# Patient Record
Sex: FEMALE | Race: WHITE | Employment: FULL TIME | URBAN - METROPOLITAN AREA
[De-identification: names, ages, dates, MRNs, and addresses within clinical notes are randomized per-mention and may not be internally consistent; named-entity substitution may affect disease eponyms.]

---

## 2022-07-07 ENCOUNTER — LAB ENCOUNTER (OUTPATIENT)
Dept: LAB | Age: 62
End: 2022-07-07
Attending: HOSPITALIST
Payer: MEDICAID

## 2022-07-07 DIAGNOSIS — J45.40 MODERATE PERSISTENT ASTHMA: Primary | ICD-10-CM

## 2022-07-07 LAB
A1AT SERPL-MCNC: 134 MG/DL (ref 90–200)
BASOPHILS # BLD AUTO: 0.03 X10(3) UL (ref 0–0.2)
BASOPHILS NFR BLD AUTO: 0.6 %
EOSINOPHIL # BLD AUTO: 0.03 X10(3) UL (ref 0–0.7)
EOSINOPHIL NFR BLD AUTO: 0.6 %
ERYTHROCYTE [DISTWIDTH] IN BLOOD BY AUTOMATED COUNT: 11.9 %
HCT VFR BLD AUTO: 43.6 %
HGB BLD-MCNC: 14 G/DL
IMM GRANULOCYTES # BLD AUTO: 0.02 X10(3) UL (ref 0–1)
IMM GRANULOCYTES NFR BLD: 0.4 %
LYMPHOCYTES # BLD AUTO: 1.52 X10(3) UL (ref 1–4)
LYMPHOCYTES NFR BLD AUTO: 29.2 %
MCH RBC QN AUTO: 32.1 PG (ref 26–34)
MCHC RBC AUTO-ENTMCNC: 32.1 G/DL (ref 31–37)
MCV RBC AUTO: 100 FL
MONOCYTES # BLD AUTO: 0.38 X10(3) UL (ref 0.1–1)
MONOCYTES NFR BLD AUTO: 7.3 %
NEUTROPHILS # BLD AUTO: 3.22 X10 (3) UL (ref 1.5–7.7)
NEUTROPHILS # BLD AUTO: 3.22 X10(3) UL (ref 1.5–7.7)
NEUTROPHILS NFR BLD AUTO: 61.9 %
PLATELET # BLD AUTO: 179 10(3)UL (ref 150–450)
RBC # BLD AUTO: 4.36 X10(6)UL
WBC # BLD AUTO: 5.2 X10(3) UL (ref 4–11)

## 2022-07-07 PROCEDURE — 83516 IMMUNOASSAY NONANTIBODY: CPT

## 2022-07-07 PROCEDURE — 86008 ALLG SPEC IGE RECOMB EA: CPT

## 2022-07-07 PROCEDURE — 85025 COMPLETE CBC W/AUTO DIFF WBC: CPT

## 2022-07-07 PROCEDURE — 82103 ALPHA-1-ANTITRYPSIN TOTAL: CPT

## 2022-07-07 PROCEDURE — 36415 COLL VENOUS BLD VENIPUNCTURE: CPT

## 2022-07-07 PROCEDURE — 86036 ANCA SCREEN EACH ANTIBODY: CPT

## 2022-07-09 LAB — Lab: <0.1 KU/L

## 2022-07-10 LAB
MYELOPEROX ANTIBODIES, IGG: 0 AU/ML
SERINE PROTEASE 3, IGG: 10 AU/ML

## 2022-07-12 ENCOUNTER — LAB ENCOUNTER (OUTPATIENT)
Dept: LAB | Age: 62
End: 2022-07-12
Attending: ALLERGY & IMMUNOLOGY
Payer: MEDICAID

## 2022-07-12 DIAGNOSIS — J30.9 ALLERGIC RHINITIS DUE TO ALLERGEN: ICD-10-CM

## 2022-07-12 DIAGNOSIS — J45.50 SEVERE PERSISTENT ASTHMA: Primary | ICD-10-CM

## 2022-07-12 DIAGNOSIS — Z91.018 ALLERGY TO OTHER FOODS: ICD-10-CM

## 2022-07-12 LAB
BASOPHILS # BLD AUTO: 0.03 X10(3) UL (ref 0–0.2)
BASOPHILS NFR BLD AUTO: 0.5 %
EOSINOPHIL # BLD AUTO: 0.03 X10(3) UL (ref 0–0.7)
EOSINOPHIL NFR BLD AUTO: 0.5 %
ERYTHROCYTE [DISTWIDTH] IN BLOOD BY AUTOMATED COUNT: 12.2 %
HCT VFR BLD AUTO: 42.3 %
HGB BLD-MCNC: 14 G/DL
IMM GRANULOCYTES # BLD AUTO: 0.04 X10(3) UL (ref 0–1)
IMM GRANULOCYTES NFR BLD: 0.7 %
LYMPHOCYTES # BLD AUTO: 1.68 X10(3) UL (ref 1–4)
LYMPHOCYTES NFR BLD AUTO: 30.7 %
MCH RBC QN AUTO: 32.6 PG (ref 26–34)
MCHC RBC AUTO-ENTMCNC: 33.1 G/DL (ref 31–37)
MCV RBC AUTO: 98.4 FL
MONOCYTES # BLD AUTO: 0.39 X10(3) UL (ref 0.1–1)
MONOCYTES NFR BLD AUTO: 7.1 %
NEUTROPHILS # BLD AUTO: 3.31 X10 (3) UL (ref 1.5–7.7)
NEUTROPHILS # BLD AUTO: 3.31 X10(3) UL (ref 1.5–7.7)
NEUTROPHILS NFR BLD AUTO: 60.5 %
PLATELET # BLD AUTO: 179 10(3)UL (ref 150–450)
RBC # BLD AUTO: 4.3 X10(6)UL
WBC # BLD AUTO: 5.5 X10(3) UL (ref 4–11)

## 2022-07-12 PROCEDURE — 82785 ASSAY OF IGE: CPT

## 2022-07-12 PROCEDURE — 86003 ALLG SPEC IGE CRUDE XTRC EA: CPT

## 2022-07-12 PROCEDURE — 85025 COMPLETE CBC W/AUTO DIFF WBC: CPT

## 2022-07-12 PROCEDURE — 36415 COLL VENOUS BLD VENIPUNCTURE: CPT

## 2022-07-14 LAB
A ALTERNATA IGE QN: <0.1 KUA/L (ref ?–0.1)
A FUMIGATUS IGE QN: <0.1 KUA/L (ref ?–0.1)
ALLERGEN, BELL PEPPER, PAPRIKA: <0.1 KU/L
ALLERGEN, LENTIL IGE: <0.1 KU/L
AMER SYCAMORE IGE QN: <0.1 KUA/L (ref ?–0.1)
BERMUDA GRASS IGE QN: <0.1 KUA/L (ref ?–0.1)
BOXELDER IGE QN: <0.1 KUA/L (ref ?–0.1)
C HERBARUM IGE QN: <0.1 KUA/L (ref ?–0.1)
CALIF WALNUT IGE QN: <0.1 KUA/L (ref ?–0.1)
CAT DANDER IGE QN: <0.1 KUA/L (ref ?–0.1)
CLAM IGE QN: <0.1 KUA/L (ref ?–0.1)
CMN PIGWEED IGE QN: <0.1 KUA/L (ref ?–0.1)
CODFISH IGE QN: <0.1 KUA/L (ref ?–0.1)
COMMON RAGWEED IGE QN: <0.1 KUA/L (ref ?–0.1)
CORN IGE QN: <0.1 KUA/L (ref ?–0.1)
CORN IGE QN: <0.1 KUA/L (ref ?–0.1)
COTTONWOOD IGE QN: <0.1 KUA/L (ref ?–0.1)
COW MILK IGE QN: <0.1 KUA/L (ref ?–0.1)
D FARINAE IGE QN: <0.1 KUA/L (ref ?–0.1)
D PTERONYSS IGE QN: 0.14 KUA/L (ref ?–0.1)
DOG DANDER IGE QN: <0.1 KUA/L (ref ?–0.1)
EGG WHITE IGE QN: <0.1 KUA/L (ref ?–0.1)
IGE SERPL-ACNC: 2.38 KU/L (ref 2–214)
IGE SERPL-ACNC: 3.48 KU/L (ref 2–214)
Lab: <0.1 KU/L
M RACEMOSUS IGE QN: <0.1 KUA/L (ref ?–0.1)
MARSH ELDER IGE QN: <0.1 KUA/L (ref ?–0.1)
MOUSE EPITH IGE QN: <0.1 KUA/L (ref ?–0.1)
MT JUNIPER IGE QN: <0.1 KUA/L (ref ?–0.1)
P NOTATUM IGE QN: <0.1 KUA/L (ref ?–0.1)
PEANUT IGE QN: <0.1 KUA/L (ref ?–0.1)
PECAN/HICK TREE IGE QN: <0.1 KUA/L (ref ?–0.1)
ROACH IGE QN: <0.1 KUA/L (ref ?–0.1)
SALTWORT IGE QN: <0.1 KUA/L (ref ?–0.1)
SCALLOP IGE QN: <0.1 KUA/L (ref ?–0.1)
SESAME SEED IGE QN: <0.1 KUA/L (ref ?–0.1)
SHRIMP IGE QN: <0.1 KUA/L (ref ?–0.1)
SOYBEAN IGE QN: <0.1 KUA/L (ref ?–0.1)
TIMOTHY IGE QN: <0.1 KUA/L (ref ?–0.1)
WALNUT IGE QN: <0.1 KUA/L (ref ?–0.1)
WHEAT IGE QN: <0.1 KUA/L (ref ?–0.1)
WHITE ASH IGE QN: <0.1 KUA/L (ref ?–0.1)
WHITE ELM IGE QN: <0.1 KUA/L (ref ?–0.1)
WHITE MULBERRY IGE QN: <0.1 KUA/L (ref ?–0.1)
WHITE OAK IGE QN: <0.1 KUA/L (ref ?–0.1)

## 2023-03-21 ENCOUNTER — TELEMEDICINE (OUTPATIENT)
Facility: CLINIC | Age: 63
End: 2023-03-21
Payer: MEDICAID

## 2023-03-21 DIAGNOSIS — J45.40 MODERATE PERSISTENT ASTHMA, UNSPECIFIED WHETHER COMPLICATED: Primary | ICD-10-CM

## 2023-03-21 RX ORDER — FLUTICASONE PROPIONATE AND SALMETEROL 500; 50 UG/1; UG/1
1 POWDER RESPIRATORY (INHALATION) 2 TIMES DAILY
Qty: 1 EACH | Refills: 5 | Status: SHIPPED | OUTPATIENT
Start: 2023-03-21 | End: 2023-04-20

## 2023-03-21 RX ORDER — ALBUTEROL SULFATE 2.5 MG/3ML
2.5 SOLUTION RESPIRATORY (INHALATION) 3 TIMES DAILY
Qty: 180 EACH | Refills: 5 | Status: SHIPPED | OUTPATIENT
Start: 2023-03-21

## 2023-03-21 RX ORDER — ALBUTEROL SULFATE 2.5 MG/3ML
2.5 SOLUTION RESPIRATORY (INHALATION) 3 TIMES DAILY
Qty: 180 EACH | Refills: 5 | Status: SHIPPED | OUTPATIENT
Start: 2023-03-21 | End: 2023-03-21

## 2023-03-21 NOTE — H&P
Pulmonary Consult Note    History of Present Illness:  Vanita Rosales is a 58year old female presenting to pulmonary clinic today for dyspnea   DARYL and asthma 'intlerant of CPAP - using dental appliance- - had to stop releted to need for crowns   Asthma better with mdi-   Saw dr tate--   Told gerd- - careful with doet- and needs to lose weight and no eercise   Gets tired - too tired to exercise - trying for short time   advair twice a day and rescue -   Last year was much better-- bought portable neb- seasalt and albuterol - feels better longer-  - daily 1-2 times in the evening   Stopped singular - ran out   Dr Stella Rios     Past Medical History: No past medical history on file. Long history of GERD though beginning 2018-following with GI at [facility]  Symptoms of DARYL-positive study now working through dental appliance  Denies any cardiac history    No history of clot no history of cancer      Past Surgical History: No past surgical history on file. Family Medical History: No family history on file. Social History: Social History    Socioeconomic History      Marital status:   Works 3-9 daily  6 children multiple grandchildren      Allergies: Patient has no allergy information on record. Medications:   No current outpatient medications on file. Review of Systems:   awakenings up at night - uses albuterol   Gaining weight   On gerd diet   Remains on 20mg ppi BID - fodmap -- esophagitis  on recent esophagram -- [address] very tired  Denies any swelling  Chest pressure is better with PPI  Continues to report herself as a light sleeper        Physical Exam:  There were no vitals taken for this visit.    Patient is comfortable no coughing no dyspnea pleasant    Results:  Reviewed     Assessment/Plan:  #History of dyspnea on exertion  History of asthma as a child improved with swimming lessons  2018 started with GERD subsequent shortness of breath following that thought related to esophagitis at that time  PFTs 6/22 with FEV1 1.44 L 56% of predicted with ratio of 65% and a 19% improvement following bronchodilators  3/23 had noted significant benefit when first on Advair with minimal rescue use now increasing rescue use-suspected component of fatigue as well--plan increase Advair to 500 and follow    #Chest pressure right side  Reports negative stress test at [facility]  Echo 2/27 with diastolic dysfunction grade 1 and normal RV function  3/23 denies any significant issues      # DARYL  Symptoms compatible  Home test 6/22 with AHI of 12.9 ashley to 76% with 2 to 3 hours of sats under 88%  Tried CPAP for 3 to 4 days reports intolerant--now with dental appliance though unable to wear secondary to need for crown--discussed mechanisms at length encouraged to    # gerd /Hx Hpylori   History of esophagitis and gastritis on scope 2018  Recent upper scope and fluoroscopy esophagram 1/23 with signs of esophagitis and gastritis-follows with  at [facility] trial diet and continue PPI twice daily    Plan- to stop advair 250 and begin advair 500 twice a day          - continue singular           - to get dental appliance asap   -        -see me in 3 months   -    Alesha Arias MD  Pulmonary Medicine  3/21/2023

## 2023-03-21 NOTE — PATIENT INSTRUCTIONS
Plan- to stop advair 250 and begin advair 500 twice a day          - continue singular           - to get dental appliance asap   -        -see me in 3 months   -    Kriss Ascencio MD  Pulmonary Medicine  3/21/2023

## 2023-05-12 ENCOUNTER — MOBILE ENCOUNTER (OUTPATIENT)
Facility: CLINIC | Age: 63
End: 2023-05-12

## 2023-05-12 RX ORDER — FLUTICASONE PROPIONATE AND SALMETEROL 500; 50 UG/1; UG/1
1 POWDER RESPIRATORY (INHALATION) 2 TIMES DAILY
Qty: 1 EACH | Refills: 0 | Status: SHIPPED | OUTPATIENT
Start: 2023-05-12 | End: 2023-06-11

## 2023-07-11 ENCOUNTER — OFFICE VISIT (OUTPATIENT)
Facility: CLINIC | Age: 63
End: 2023-07-11
Payer: MEDICAID

## 2023-07-11 VITALS
OXYGEN SATURATION: 98 % | BODY MASS INDEX: 36 KG/M2 | HEART RATE: 84 BPM | WEIGHT: 224 LBS | RESPIRATION RATE: 14 BRPM | SYSTOLIC BLOOD PRESSURE: 124 MMHG | HEIGHT: 66 IN | DIASTOLIC BLOOD PRESSURE: 74 MMHG

## 2023-07-11 DIAGNOSIS — J45.41 MODERATE PERSISTENT ASTHMA WITH ACUTE EXACERBATION: Primary | ICD-10-CM

## 2023-07-11 PROCEDURE — 3008F BODY MASS INDEX DOCD: CPT | Performed by: INTERNAL MEDICINE

## 2023-07-11 PROCEDURE — 99214 OFFICE O/P EST MOD 30 MIN: CPT | Performed by: INTERNAL MEDICINE

## 2023-07-11 PROCEDURE — 3078F DIAST BP <80 MM HG: CPT | Performed by: INTERNAL MEDICINE

## 2023-07-11 PROCEDURE — 3074F SYST BP LT 130 MM HG: CPT | Performed by: INTERNAL MEDICINE

## 2023-07-11 RX ORDER — FLUTICASONE PROPIONATE AND SALMETEROL 500; 50 UG/1; UG/1
1 POWDER RESPIRATORY (INHALATION) 2 TIMES DAILY
COMMUNITY

## 2023-07-11 NOTE — PATIENT INSTRUCTIONS
Plan- continue  advair 500 twice a day - rinse and spit          - resume singular every night   Continue nexium twice a day   Begin pepcid ac- twice a day - as trial for 2 weeks -        -           - to get dental appliance asap -           - to get PFTS   -        -see me in 2-3  months   -    Yi Lu MD  Pulmonary Medicine  7/11/2023

## 2023-09-18 ENCOUNTER — TELEPHONE (OUTPATIENT)
Facility: CLINIC | Age: 63
End: 2023-09-18

## 2023-09-18 NOTE — TELEPHONE ENCOUNTER
Pt is requesting her PFT order be sent to [facility] [place]\" but no such providing location found. Called the facility she quoted the address from but they are only a Cardiac doctor. Was directed to [facility] for [employer] but they have no facilities in \"[place]\" or [facility] as pt mentioned may be the case. Before we forward the order I requested pt cb to confirm where she plans to be tested and we'll forward accordingly.

## 2023-09-20 NOTE — TELEPHONE ENCOUNTER
Spoke with pt. Stated has hard copy of previous PFT results done at [facility], will bring those results to appointment scheduled with Dr. Kathleen Estrella on 10/11/23. Pt states she has another order for PFT scheduled for 10/4 at [facility]. Pt advised to give them our fax number for results to be faxed over to us for Dr. Kathleen Estrella to have for comparison. Pt verbalized understanding. Pt unable to take fax number at this time, wants number to be provided via [employer] message. Message sent via [address]  PFT order faxed to [facility] Cardiopulmonary department @ [phone]

## 2023-10-04 DIAGNOSIS — J45.40 MODERATE PERSISTENT ASTHMA, UNSPECIFIED WHETHER COMPLICATED: ICD-10-CM

## 2023-10-04 DIAGNOSIS — J45.41 MODERATE PERSISTENT ASTHMA WITH ACUTE EXACERBATION: Primary | ICD-10-CM

## 2023-10-04 NOTE — TELEPHONE ENCOUNTER
Received RX refill request for: Advair Diskus. Pt has next scheduled appt: 10/11/23. Pt last OV: 07/11/23. Last OV note states: \"Plan- continue  advair 500 twice a day - rinse and spit \"  RX pended and routed to provider.

## 2023-10-06 RX ORDER — FLUTICASONE PROPIONATE AND SALMETEROL 500; 50 UG/1; UG/1
1 POWDER RESPIRATORY (INHALATION) 2 TIMES DAILY
Qty: 3 EACH | Refills: 3 | Status: SHIPPED | OUTPATIENT
Start: 2023-10-06 | End: 2023-11-05

## 2023-11-28 DIAGNOSIS — J45.40 MODERATE PERSISTENT ASTHMA, UNSPECIFIED WHETHER COMPLICATED: Primary | ICD-10-CM

## 2023-11-28 RX ORDER — FLUTICASONE PROPIONATE AND SALMETEROL 500; 50 UG/1; UG/1
1 POWDER RESPIRATORY (INHALATION) 2 TIMES DAILY
Qty: 1 EACH | Refills: 3 | Status: SHIPPED | OUTPATIENT
Start: 2023-11-28

## 2023-11-28 NOTE — TELEPHONE ENCOUNTER
Received RX refill request for: ADVAIR DISKUS 500-50 MCG/ACT Inhalation Aerosol Powder   Pt has next scheduled appt: 11/30/23. Pt last OV: 07/11/23. Last OV note states: \"continue  advair 500 twice a day - rinse and spit \"  RX pended and routed to provider.

## 2023-11-30 ENCOUNTER — TELEMEDICINE (OUTPATIENT)
Facility: CLINIC | Age: 63
End: 2023-11-30
Payer: MEDICAID

## 2023-11-30 DIAGNOSIS — J45.40 MODERATE PERSISTENT ASTHMA, UNSPECIFIED WHETHER COMPLICATED: ICD-10-CM

## 2023-11-30 DIAGNOSIS — G47.33 OSA (OBSTRUCTIVE SLEEP APNEA): Primary | ICD-10-CM

## 2023-11-30 PROCEDURE — 99214 OFFICE O/P EST MOD 30 MIN: CPT | Performed by: INTERNAL MEDICINE

## 2023-11-30 RX ORDER — MONTELUKAST SODIUM 10 MG/1
10 TABLET ORAL NIGHTLY
Qty: 90 TABLET | Refills: 3 | Status: SHIPPED | OUTPATIENT
Start: 2023-11-30

## 2023-11-30 NOTE — PROGRESS NOTES
Pulmonary Consult Note    History of Present Illness:  Rina Wright is a 61year old female presenting to pulmonary clinic today for dyspnea   DARYL and asthma 'intlerant of CPAP - using dental appliance- - had to stop releted to need for crowns   Asthma better with mdi-   Saw dr tate--   Told gerd- - careful with doet- and needs to lose weight and no eercise   Gets tired - too tired to exercise - trying for short time   advair twice a day and rescue -   Last year was much better-- bought portable neb- seasalt and albuterol - feels better longer-  - daily 1-2 times in the evening   Stopped singular - ran out   Dr Karma Marina     Was doing well on advair 500   Last month with problem with weather and air quality -   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-   Limited by breathing to do job   Steroids thinks contributing   Asthma started 2018- zoomba etc with exercise- same sx as little girl and with pregnancy     Started dental appliance no change and then problem with teeth and now with root mariam again - stomach upset with antibiotics and GERD makes asthma worse - thinks on ppi 20mg BID-   GI at [facility]-   Started with nutritionist - fodda diet - no change in GERD --  Sinus is OK_ some throat clearing and remains on flonase and astelin   Did not resume singular -   Remains with sense of chest tightness thinks gerd related -   Had swallowing video - slower passage   Had PFTS advocate rajni     11.23 - increased asthma - this year- - better. With advair 500   Works outside- bad with air quality   4-5 weeks ago with HA- as new issue and with dizzyness - thought related to sleep -- - throbbing- though better now and awaiting for MRI approval   Very sleepy - using dental appliance- wearing at night -   ?  Migraines - not caused by appliance -   Unable to stop advair for 48hours for PFTS -   HA are better now   Uses rescue at night when awakens - not during the day   Some coughing in am - shortness of breath is limiting   Much better overalll - worse in cold air and bad air   Goes to areas of storms and house issues -trying to get finacial aid to be    Remains with gerd - and bloating     30min video visit         Past Medical History:   Past Medical History:   Diagnosis Date    ALCOHOL USE     Socially    Asthma 2018    Hyperlipidemia Years ago    Obesity Long time ago    Years ago    Sleep apnea       Long history of GERD though beginning 2018-following with GI at [facility]  Symptoms of DARYL-positive study now working through dental appliance  Denies any cardiac history    No history of clot no history of cancer      Past Surgical History:   Past Surgical History:   Procedure Laterality Date      , , , ,,     6    COLONOSCOPY      [facility]    OTHER SURGICAL HISTORY      Abdominoplasty    TUBAL LIGATION         Family Medical History:   Family History   Problem Relation Age of Onset    Cancer Mother         Had a oace maker    Heart Disorder Mother     Heart Disorder Father     Stroke Maternal Grandfather     Hypertension Maternal Grandmother     Asthma Paternal Grandfather     Diabetes Paternal Grandfather        Social History: Social History    Socioeconomic History      Marital status:   Works 3-9 daily  6 children multiple grandchildren      Allergies: Patient has no known allergies. Medications:   Current Outpatient Medications   Medication Sig Dispense Refill    fluticasone-salmeterol (ADVAIR DISKUS) 500-50 MCG/ACT Inhalation Aerosol Powder, Breath Activated Inhale 1 puff into the lungs 2 (two) times daily. 1 each 3    albuterol (2.5 MG/3ML) 0.083% Inhalation Nebu Soln Take 3 mL (2.5 mg total) by nebulization 3 (three) times daily.  180 each 5       Review of Systems:   awakenings up at night - uses albuterol   Gaining weight -- was 221 now 224- 217 now   On gerd diet   Remains on 20mg ppi BID - fodmap -- esophagitis  on recent esophagram -- [employer] - notes bloating   Had zoom visit - scopes last year- cpm - for nutirtionist   No swelling in legs   Remains very tired  Denies any swelling  Chest pressure is better with PPI  Continues to report herself as a light sleeper  Using dental appliance -- did not repeat test           Physical Exam:  There were no vitals taken for this visit. Patient is comfortable no coughing no dyspnea pleasant  Results:  Reviewed     Assessment/Plan:    #History of dyspnea on exertion  History of asthma as a child improved with swimming lessons  2018 started with GERD subsequent shortness of breath following that thought related to esophagitis at that time  PFTs 6/22 with FEV1 1.44 L 56% of predicted with ratio of 65% and a 19% improvement following bronchodilators  3/23 had noted significant benefit when first on Advair with minimal rescue use now increasing rescue use-suspected component of fatigue as well--plan increase Advair to 500 and follow  7.23- was better then recurred with bad air and outside work - - to resume singular - ?  GERD related -plan for trial of PPI-May need to consider adding LAMA-recheck PFTs  11/23--reports a rough summer overall better on Advair 500  Worse with air quality issues-notes increased symptoms when stopping Advair  Plan to continue present management resume Singulair-and await PFTs      #Chest pressure right side  Reports negative stress test at [facility]  Echo 2/24 with diastolic dysfunction grade 1 and normal RV function  3/23 denies any significant issues  7/23 denies issues      #New onset headaches  Occurred after stopping Singulair  Does not believe associated with her dental appliance as they started prior to use  Continues to follow with primary with MRI pending        # DARYL  Symptoms compatible  Home test 6/22 with AHI of 12.9 ashley to 76% with 2 to 3 hours of sats under 88%  Tried CPAP for 3 to 4 days reports intolerant--now with dental appliance though unable to wear secondary to need for crown--discussed mechanisms at length encouraged to  7.23- - never resumed dental appliance- - ongoing teeth issues limiting   Discussed at length needs repeat study once appliance is functional versus repeat trial of CPAP following titration  11/23 believes her appliance is fully adjusted with plans for sleep study using and benefiting    # gerd /Hx Hpylori   History of esophagitis and gastritis on scope 2018  Recent upper scope and fluoroscopy esophagram 1/23 with signs of esophagitis and gastritis-follows with  at [facility] trial diet and continue PPI twice daily  7.23- flare  of asthma and ongoing /increased GERD_ sx- to add pepcid as trial - may need to see GI again   11/23 overall believes under control on PPI twice daily    # allergies   Remains on astelin and flonase twice a day and well controlled   Follows with [facility] at times         Plan- continue  advair 500 twice a day - rinse and spit          - resume singular every night -- stop for dreams           -to get repeat sleep study with dental appliance in place           -Plan for repeat PFTs when able   -        -see me in 3-4 months   -    Blu Goldman MD  Pulmonary Medicine  11/30/2023

## 2024-04-04 DIAGNOSIS — J45.40 MODERATE PERSISTENT ASTHMA, UNSPECIFIED WHETHER COMPLICATED (HCC): ICD-10-CM

## 2024-04-04 RX ORDER — FLUTICASONE PROPIONATE AND SALMETEROL 50; 500 UG/1; UG/1
1 POWDER RESPIRATORY (INHALATION) 2 TIMES DAILY
Qty: 1 EACH | Refills: 0 | Status: SHIPPED | OUTPATIENT
Start: 2024-04-04

## 2024-04-04 NOTE — TELEPHONE ENCOUNTER
LOV 11/30/23 with TZ    Plan- continue  advair 500 twice a day - rinse and spit          - resume singular every night -- stop for dreams           -to get repeat sleep study with dental appliance in place           -Plan for repeat PFTs when able   -        -see me in 3-4 months       Pt has not been seen within 3-4 months, has no pending appt with our office.    1 refill given, will need appt for additional refills

## 2024-05-14 DIAGNOSIS — J45.40 MODERATE PERSISTENT ASTHMA, UNSPECIFIED WHETHER COMPLICATED (HCC): ICD-10-CM

## 2024-05-14 RX ORDER — FLUTICASONE PROPIONATE AND SALMETEROL 50; 500 UG/1; UG/1
1 POWDER RESPIRATORY (INHALATION) 2 TIMES DAILY
Qty: 1 EACH | Refills: 3 | Status: SHIPPED | OUTPATIENT
Start: 2024-05-14

## 2024-06-04 ENCOUNTER — OFFICE VISIT (OUTPATIENT)
Dept: SLEEP CENTER | Age: 64
End: 2024-06-04
Attending: Other
Payer: MEDICAID

## 2024-06-04 DIAGNOSIS — G47.33 OSA (OBSTRUCTIVE SLEEP APNEA): ICD-10-CM

## 2024-06-04 PROCEDURE — 95806 SLEEP STUDY UNATT&RESP EFFT: CPT

## 2024-06-11 ENCOUNTER — SLEEP STUDY (OUTPATIENT)
Facility: CLINIC | Age: 64
End: 2024-06-11
Payer: MEDICAID

## 2024-06-11 DIAGNOSIS — G47.9 SLEEP DISORDER: Primary | ICD-10-CM

## 2024-06-11 PROCEDURE — 95806 SLEEP STUDY UNATT&RESP EFFT: CPT | Performed by: OTHER

## 2024-06-12 ENCOUNTER — TELEPHONE (OUTPATIENT)
Facility: CLINIC | Age: 64
End: 2024-06-12

## 2024-06-12 NOTE — TELEPHONE ENCOUNTER
Phone call to patient  Unattended sleep study reviewed--- patient reports that she took her dental appliance out after about 3 hours into the study  Discussed the fact that study is unhelpful in terms now of the efficacy of her dental appliance  Will ask Dr. Zacarias to review and see if more information can be gleaned  Patient to call me back in a week

## 2024-08-20 ENCOUNTER — OFFICE VISIT (OUTPATIENT)
Facility: CLINIC | Age: 64
End: 2024-08-20
Payer: MEDICAID

## 2024-08-20 VITALS
HEART RATE: 70 BPM | OXYGEN SATURATION: 100 % | BODY MASS INDEX: 37.65 KG/M2 | RESPIRATION RATE: 16 BRPM | SYSTOLIC BLOOD PRESSURE: 128 MMHG | WEIGHT: 226 LBS | HEIGHT: 65 IN | DIASTOLIC BLOOD PRESSURE: 70 MMHG

## 2024-08-20 DIAGNOSIS — R06.00 DYSPNEA AND RESPIRATORY ABNORMALITIES: Primary | ICD-10-CM

## 2024-08-20 DIAGNOSIS — J45.40 MODERATE PERSISTENT ASTHMA, UNSPECIFIED WHETHER COMPLICATED (HCC): ICD-10-CM

## 2024-08-20 DIAGNOSIS — R06.89 DYSPNEA AND RESPIRATORY ABNORMALITIES: Primary | ICD-10-CM

## 2024-08-20 DIAGNOSIS — G47.33 OSA (OBSTRUCTIVE SLEEP APNEA): ICD-10-CM

## 2024-08-20 PROCEDURE — 99214 OFFICE O/P EST MOD 30 MIN: CPT | Performed by: INTERNAL MEDICINE

## 2024-08-20 RX ORDER — FLUTICASONE PROPIONATE 50 MCG
2 SPRAY, SUSPENSION (ML) NASAL DAILY
COMMUNITY
Start: 2024-07-12

## 2024-08-20 RX ORDER — AZELASTINE 1 MG/ML
2 SPRAY, METERED NASAL 2 TIMES DAILY
COMMUNITY
Start: 2023-08-18

## 2024-08-20 RX ORDER — AZELASTINE HYDROCHLORIDE 0.5 MG/ML
1 SOLUTION/ DROPS OPHTHALMIC 2 TIMES DAILY
COMMUNITY
Start: 2024-02-20 | End: 2025-02-19

## 2024-08-20 RX ORDER — AZELASTINE 1 MG/ML
1 SPRAY, METERED NASAL 2 TIMES DAILY
Qty: 1 EACH | Refills: 5 | Status: SHIPPED | OUTPATIENT
Start: 2024-08-20

## 2024-08-20 NOTE — PROGRESS NOTES
Pulmonary Consult Note    History of Present Illness:  Gisele Hernandez is a 64 year old female presenting to pulmonary clinic today for dyspnea   DRAYL and asthma 'intlerant of CPAP - using dental appliance- - had to stop releted to need for crowns   Asthma better with mdi-   Saw dr ttae--   Told gerd- - careful with doet- and needs to lose weight and no eercise   Gets tired - too tired to exercise - trying for short time   advair twice a day and rescue -   Last year was much better-- bought portable neb- seasalt and albuterol - feels better longer-  - daily 1-2 times in the evening   Stopped singular - ran out   Dr best- lucinda     Was doing well on advair 500   Last month with problem with weather and air quality -   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-   Limited by breathing to do job   Steroids thinks contributing   Asthma started 2018- zoomba etc with exercise- same sx as little girl and with pregnancy     Started dental appliance no change and then problem with teeth and now with root mariam again - stomach upset with antibiotics and GERD makes asthma worse - thinks on ppi 20mg BID-   GI at [facility]-   Started with nutritionist - fodda diet - no change in GERD --  Sinus is OK_ some throat clearing and remains on flonase and astelin   Did not resume singular -   Remains with sense of chest tightness thinks gerd related -   Had swallowing video - slower passage   Had PFTS advocate rajni     11.23 - increased asthma - this year- - better. With advair 500   Works outside- bad with air quality   4-5 weeks ago with HA- as new issue and with dizzyness - thought related to sleep -- - throbbing- though better now and awaiting for MRI approval   Very sleepy - using dental appliance- wearing at night -   ? Migraines - not caused by appliance -   Unable to stop advair for 48hours for PFTS -   HA are better now   Uses rescue at night when awakens - not during the day   Some  coughing in am - shortness of breath is limiting   Much better overalll - worse in cold air and bad air   Goes to areas of storms and house issues -trying to get finacial aid to be    Remains with gerd - and bloating     - remains now with recurrance of right side chest pressure-- hurts to press on it -\"sensitive\" ? Eating related ?- can't lay on left  side when occurs -related to pressure    4-5 episodes this year-- thinks related to food- remains on nexium - in am and eats early - knows has reflux-- seen on egd-- but does not feel - all the time with bloating - better if fasting- no BM issues - magnesium or tea for constipation   Breathing is good - unless has the pressure -   Remains with ongoing sense of pressure - if no pressure remains mostly OK_ like prior to 2018         Past Medical History:   Past Medical History:    ALCOHOL USE    Socially    Asthma (HCC)    Hyperlipidemia    Obesity    Years ago    Sleep apnea      Long history of GERD though beginning -following with GI at [facility]  Symptoms of DARYL-positive study now working through dental appliance  Denies any cardiac history    No history of clot no history of cancer      Past Surgical History:   Past Surgical History:   Procedure Laterality Date      , , , ,, 1989    Colonoscopy      Hysterectomy      Other surgical history      Abdominoplasty    Tubal ligation         Family Medical History:   Family History   Problem Relation Age of Onset    Cancer Mother         Had a oace maker    Heart Disorder Mother     Heart Disorder Father     Stroke Maternal Grandfather     Hypertension Maternal Grandmother     Asthma Paternal Grandfather     Diabetes Paternal Grandfather        Social History: Social History    Socioeconomic History      Marital status:   Works 3-9 daily  6 children multiple grandchildren      Allergies: Patient has no known allergies.     Medications:    Current Outpatient Medications   Medication Sig Dispense Refill    Azelastine HCl 0.05 % Ophthalmic Solution Apply 1 drop to eye 2 (two) times daily.      azelastine 0.1 % Nasal Solution 2 sprays by Nasal route 2 (two) times daily.      fluticasone propionate 50 MCG/ACT Nasal Suspension 2 sprays by Each Nare route daily.      Esomeprazole Magnesium 20 MG Oral Capsule Delayed Release Take 1 capsule (20 mg total) by mouth daily.      fluticasone-salmeterol (ADVAIR DISKUS) 500-50 MCG/ACT Inhalation Aerosol Powder, Breath Activated INHALE 1 PUFF BY MOUTH 2 TIMES A DAY 1 each 3    albuterol (2.5 MG/3ML) 0.083% Inhalation Nebu Soln Take 3 mL (2.5 mg total) by nebulization 3 (three) times daily. 180 each 5    montelukast (SINGULAIR) 10 MG Oral Tab Take 1 tablet (10 mg total) by mouth nightly. (Patient not taking: Reported on 8/20/2024) 90 tablet 3       Review of Systems:   awakenings up at night - uses albuterol   Gaining weight -- was 221 now 224- 217 now   On gerd diet   Remains on 20mg ppi BID - fodmap -- esophagitis  on recent esophagram -- [facility] - notes bloating   Had zoom visit - scopes last year- cpm - for nutirtionist   No swelling in legs   Remains very tired  Denies any swelling  Chest pressure is better with PPI  Continues to report herself as a light sleeper  Using dental appliance -- did not repeat test           Physical Exam:  /70 (BP Location: Right arm, Patient Position: Sitting, Cuff Size: adult)   Pulse 70   Resp 16   Ht 5' 5\" (1.651 m)   Wt 226 lb (102.5 kg)   SpO2 100%   BMI 37.61 kg/m²    Patient is comfortable no coughing no dyspnea pleasant  Results:  Reviewed     Assessment/Plan:    #History of dyspnea on exertion  History of asthma as a child improved with swimming lessons  2018 started with GERD subsequent shortness of breath following that thought related to esophagitis at that time  PFTs 6/22 with FEV1 1.44 L 56% of predicted with ratio of 65% and a 19%  improvement following bronchodilators  3/23 had noted significant benefit when first on Advair with minimal rescue use now increasing rescue use-suspected component of fatigue as well--plan increase Advair to 500 and follow  7.23- was better then recurred with bad air and outside work - - to resume singular - ? GERD related -plan for trial of PPI-May need to consider adding LAMA-recheck PFTs  11/23--reports a rough summer overall better on Advair 500  Worse with air quality issues-notes increased symptoms when stopping Advair  Plan to continue present management resume Singulair-and await PFTs  8/24- stopped singular for dreams remains on advair BID and overall seems to be doing well no primary complaint remains chest pressure      #Chest pressure right side  Reports negative stress test at [facility]  Echo 7/22 with diastolic dysfunction grade 1 and normal RV function  3/23 denies any significant issues  7/23 denies issues      #New onset headaches  Occurred after stopping Singulair  Does not believe associated with her dental appliance as they started prior to use  Continues to follow with primary with MRI pending        # DARYL  Symptoms compatible  Home test 6/22 with AHI of 12.9 ashley to 76% with 2 to 3 hours of sats under 88%  Tried CPAP for 3 to 4 days reports intolerant--now with dental appliance though unable to wear secondary to need for crown--discussed mechanisms at length encouraged to  7.23- - never resumed dental appliance- - ongoing teeth issues limiting   Discussed at length needs repeat study once appliance is functional versus repeat trial of CPAP following titration  11/23 believes her appliance is fully adjusted with plans for sleep study using and benefiting  8/24- had PSG with appliance - did advance sl - thinks ? Room for improvement - no jaw pain -     # gerd /Hx Hpylori   History of esophagitis and gastritis on scope 2018  Recent upper scope and fluoroscopy esophagram 1/23 with signs of  esophagitis and gastritis-follows with  at [facility]-to trial diet and continue PPI twice daily  7.23- flare  of asthma and ongoing /increased GERD_ sx- to add pepcid as trial - may need to see GI again   11/23 overall believes under control on PPI twice daily    # allergies   Remains on astelin and flonase twice a day and well controlled   Follows with [facility] at times   8/24- no recent visit - - remains on spray- flonase only         Plan- continue  advair 500 twice a day - rinse and spit           - to get ct chest           - to resume Astelin nasal spray                  -plan to max out dental appliance and then for overnight oximeter -   -        -see me in 4 months   -    Negrita Mccall MD  Pulmonary Medicine  8/20/2024

## 2024-08-20 NOTE — PATIENT INSTRUCTIONS
Plan- continue  advair 500 twice a day - rinse and spit           - to get ct chest - to get on disc if not at [facility]           - to resume Astelin nasal spray                  -plan to max out dental appliance and then for overnight oximeter -   -        -see me in 4 months   -    Negrita Mccall MD  Pulmonary Medicine  8/20/2024

## 2024-09-09 ENCOUNTER — TELEPHONE (OUTPATIENT)
Facility: CLINIC | Age: 64
End: 2024-09-09

## 2024-09-09 DIAGNOSIS — J45.40 MODERATE PERSISTENT ASTHMA, UNSPECIFIED WHETHER COMPLICATED (HCC): ICD-10-CM

## 2024-09-09 DIAGNOSIS — G47.33 OSA (OBSTRUCTIVE SLEEP APNEA): ICD-10-CM

## 2024-09-09 DIAGNOSIS — R06.89 DYSPNEA AND RESPIRATORY ABNORMALITIES: Primary | ICD-10-CM

## 2024-09-09 DIAGNOSIS — R06.00 DYSPNEA AND RESPIRATORY ABNORMALITIES: Primary | ICD-10-CM

## 2024-09-09 RX ORDER — FLUTICASONE PROPIONATE AND SALMETEROL 50; 500 UG/1; UG/1
1 POWDER RESPIRATORY (INHALATION) 2 TIMES DAILY
Qty: 1 EACH | Refills: 3 | Status: SHIPPED | OUTPATIENT
Start: 2024-09-09

## 2024-09-09 RX ORDER — FLUTICASONE PROPIONATE 50 MCG
2 SPRAY, SUSPENSION (ML) NASAL DAILY
Qty: 16 G | Refills: 3 | Status: SHIPPED | OUTPATIENT
Start: 2024-09-09

## 2024-09-09 NOTE — TELEPHONE ENCOUNTER
Patient last seen by Dr. Mccall on 11/30/23.  Per visit notes patient to continue on Advair 500. Follow up  appointment on 12/18/24. Refills given.

## 2024-09-09 NOTE — TELEPHONE ENCOUNTER
Received a notification that HRCT denied.  Need chest xray first that shows something is wrong with pt's lungs.  Message forwarded to Dr. Mccall.  Per Dr. Mccall, send order for chest xray.  Pt notified to cancel HRCT and to schedule chest xray.

## 2024-09-17 ENCOUNTER — HOSPITAL ENCOUNTER (OUTPATIENT)
Dept: GENERAL RADIOLOGY | Age: 64
Discharge: HOME | End: 2024-09-17
Attending: INTERNAL MEDICINE
Payer: MEDICAID

## 2024-09-17 DIAGNOSIS — R06.00 DYSPNEA AND RESPIRATORY ABNORMALITIES: ICD-10-CM

## 2024-09-17 DIAGNOSIS — J45.40 MODERATE PERSISTENT ASTHMA, UNSPECIFIED WHETHER COMPLICATED (HCC): ICD-10-CM

## 2024-09-17 DIAGNOSIS — R06.89 DYSPNEA AND RESPIRATORY ABNORMALITIES: ICD-10-CM

## 2024-09-17 DIAGNOSIS — G47.33 OSA (OBSTRUCTIVE SLEEP APNEA): ICD-10-CM

## 2024-09-17 PROCEDURE — 71046 X-RAY EXAM CHEST 2 VIEWS: CPT | Performed by: INTERNAL MEDICINE

## 2024-09-26 ENCOUNTER — TELEPHONE (OUTPATIENT)
Facility: CLINIC | Age: 64
End: 2024-09-26

## 2024-09-26 NOTE — TELEPHONE ENCOUNTER
Patient called made aware of denied ct scan and steps this RN will take to help overturn denial (appeal in writing). Patient verbalized understanding, will cancel appointment. States would like to get CT scan as she feels there is something going on perhaps the granuloma and patient still symptomatic.

## 2024-09-26 NOTE — TELEPHONE ENCOUNTER
Patient called to notify that CT scan has been denied. An appeal has to be done and letter written. Patient called to advise to cancel CT scan until we get authorization. Left voicemail to return call. [id number] sent to patient notifying of the above.

## 2024-10-02 ENCOUNTER — TELEPHONE (OUTPATIENT)
Facility: CLINIC | Age: 64
End: 2024-10-02

## 2024-10-07 ENCOUNTER — TELEPHONE (OUTPATIENT)
Facility: CLINIC | Age: 64
End: 2024-10-07

## 2024-10-07 NOTE — TELEPHONE ENCOUNTER
Confirmed with Sarah CHRIS In central authorization and confirmed that PA for CT chest has been approved.  Pt  notified.  She asked if it will be covered at the facility in [place].  Advised pt that if the [place] facility is part of [place], they should be able to view the authorization.  Pt verbalizes understanding.

## 2024-10-20 ENCOUNTER — HOSPITAL ENCOUNTER (OUTPATIENT)
Dept: CT IMAGING | Age: 64
End: 2024-10-20
Attending: INTERNAL MEDICINE
Payer: MEDICAID

## 2024-10-20 ENCOUNTER — HOSPITAL ENCOUNTER (OUTPATIENT)
Dept: CT IMAGING | Age: 64
Discharge: HOME | End: 2024-10-20
Attending: INTERNAL MEDICINE
Payer: MEDICAID

## 2024-10-20 DIAGNOSIS — R06.89 DYSPNEA AND RESPIRATORY ABNORMALITIES: ICD-10-CM

## 2024-10-20 DIAGNOSIS — R06.00 DYSPNEA AND RESPIRATORY ABNORMALITIES: ICD-10-CM

## 2024-10-20 PROCEDURE — 71250 CT THORAX DX C-: CPT | Performed by: INTERNAL MEDICINE

## 2024-12-18 ENCOUNTER — OFFICE VISIT (OUTPATIENT)
Facility: CLINIC | Age: 64
End: 2024-12-18
Payer: MEDICAID

## 2024-12-18 VITALS
HEIGHT: 65 IN | WEIGHT: 230 LBS | OXYGEN SATURATION: 98 % | DIASTOLIC BLOOD PRESSURE: 68 MMHG | BODY MASS INDEX: 38.32 KG/M2 | RESPIRATION RATE: 18 BRPM | SYSTOLIC BLOOD PRESSURE: 128 MMHG | HEART RATE: 77 BPM

## 2024-12-18 DIAGNOSIS — R06.00 DYSPNEA AND RESPIRATORY ABNORMALITIES: Primary | ICD-10-CM

## 2024-12-18 DIAGNOSIS — J45.40 MODERATE PERSISTENT ASTHMA, UNSPECIFIED WHETHER COMPLICATED (HCC): ICD-10-CM

## 2024-12-18 DIAGNOSIS — G47.33 OSA (OBSTRUCTIVE SLEEP APNEA): ICD-10-CM

## 2024-12-18 DIAGNOSIS — R06.89 DYSPNEA AND RESPIRATORY ABNORMALITIES: Primary | ICD-10-CM

## 2024-12-18 PROCEDURE — 99214 OFFICE O/P EST MOD 30 MIN: CPT | Performed by: INTERNAL MEDICINE

## 2024-12-18 NOTE — PROGRESS NOTES
Pulmonary Consult Note    History of Present Illness:  Gisele Hernandez is a 64 year old female presenting to pulmonary clinic today for dyspnea   DARYL and asthma 'intlerant of CPAP - using dental appliance- - had to stop releted to need for crowns   Asthma better with mdi-   Saw dr tate--   Told gerd- - careful with doet- and needs to lose weight and no eercise   Gets tired - too tired to exercise - trying for short time   advair twice a day and rescue -   Last year was much better-- bought portable neb- seasalt and albuterol - feels better longer-  - daily 1-2 times in the evening   Stopped singular - ran out   Dr best- lucinda     Was doing well on advair 500   Last month with problem with weather and air quality -   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-   Limited by breathing to do job   Steroids thinks contributing   Asthma started 2018- zoomba etc with exercise- same sx as little girl and with pregnancy     Started dental appliance no change and then problem with teeth and now with root mariam again - stomach upset with antibiotics and GERD makes asthma worse - thinks on ppi 20mg BID-   GI at [facility]-   Started with nutritionist - fodda diet - no change in GERD --  Sinus is OK_ some throat clearing and remains on flonase and astelin   Did not resume singular -   Remains with sense of chest tightness thinks gerd related -   Had swallowing video - slower passage   Had PFTS advocate rajni     11.23 - increased asthma - this year- - better. With advair 500   Works outside- bad with air quality   4-5 weeks ago with HA- as new issue and with dizzyness - thought related to sleep -- - throbbing- though better now and awaiting for MRI approval   Very sleepy - using dental appliance- wearing at night -   ? Migraines - not caused by appliance -   Unable to stop advair for 48hours for PFTS -   HA are better now   Uses rescue at night when awakens - not during the day   Some  coughing in am - shortness of breath is limiting   Much better overalll - worse in cold air and bad air   Goes to areas of storms and house issues -trying to get finacial aid to be    Remains with gerd - and bloating     - remains now with recurrance of right side chest pressure-- hurts to press on it -\"sensitive\" ? Eating related ?- can't lay on left  side when occurs -related to pressure    4-5 episodes this year-- thinks related to food- remains on nexium - in am and eats early - knows has reflux-- seen on egd-- but does not feel - all the time with bloating - better if fasting- no BM issues - magnesium or tea for constipation   Breathing is good - unless has the pressure -   Remains with ongoing sense of pressure - if no pressure remains mostly OK_ like prior to 2018     - about the same -- chest pressure thinks is better less feeling it - thinks advair 500 caused weight gain and hair loss -   Needs meds - less rescue use - - remains advair 500 bid -   Some coughing in am - time to clear   Breathing is pretty good -         Past Medical History:   Past Medical History:    ALCOHOL USE    Socially    Asthma (HCC)    Hyperlipidemia    Obesity    Years ago    Sleep apnea      Long history of GERD though beginning 2018-following with GI at [facility]  Symptoms of DARYL-positive study now working through dental appliance  Denies any cardiac history    No history of clot no history of cancer      Past Surgical History:   Past Surgical History:   Procedure Laterality Date      , , , ,, 1989    Colonoscopy      Hysterectomy      Other surgical history  2017    Abdominoplasty    Tubal ligation         Family Medical History:   Family History   Problem Relation Age of Onset    Cancer Mother         Had a oace maker    Heart Disorder Mother     Heart Disorder Father     Stroke Maternal Grandfather     Hypertension Maternal Grandmother     Asthma  Paternal Grandfather     Diabetes Paternal Grandfather        Social History: Social History    Socioeconomic History      Marital status:   Works 3-9 daily  6 children multiple grandchildren      Allergies: Patient has no known allergies.     Medications:   Current Outpatient Medications   Medication Sig Dispense Refill    fluticasone-salmeterol (ADVAIR DISKUS) 500-50 MCG/ACT Inhalation Aerosol Powder, Breath Activated INHALE 1 PUFF BY MOUTH 2 TIMES A DAY 1 each 3    fluticasone propionate 50 MCG/ACT Nasal Suspension INSTILL 2 SPRAYS IN EACH NOSTRIL EVERY DAY 16 g 3    azelastine 0.1 % Nasal Solution 2 sprays by Nasal route 2 (two) times daily.      Esomeprazole Magnesium 20 MG Oral Capsule Delayed Release Take 1 capsule (20 mg total) by mouth daily.      albuterol (2.5 MG/3ML) 0.083% Inhalation Nebu Soln Take 3 mL (2.5 mg total) by nebulization 3 (three) times daily. 180 each 5    montelukast (SINGULAIR) 10 MG Oral Tab Take 1 tablet (10 mg total) by mouth nightly. 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 No acute distress.   HEENT: Head NC/AT. PEERL. Throat is clear  small area nares red and swollen per ? Polyp on the right   Cardio: . Rrr no murmer   Respiratory:  seem clear now - no rales no wheeze   GI:  Abd soft, non-tender.  Extremities: No clubbing .trace at most   Neurologic: A&Ox3. No gross motor deficits.  Skin: Warm, dry.no rashes or hives noted   Lymphatic: No cervical or supraclavicular lymphadenopathy.no jvd   Psych: Calm, cooperative. Pleasant affect.     Assessment/Plan:    #History of dyspnea on exertion  History of asthma as a child improved with swimming lessons  2018 started with GERD subsequent shortness of breath following that thought related to esophagitis at that time  PFTs 6/22 with FEV1 1.44 L 56% of predicted with ratio of 65% and a 19% improvement following bronchodilators  3/23 had noted significant benefit when first on Advair with minimal rescue use now increasing rescue use-suspected component of fatigue as well--plan increase Advair to 500 and follow  7.23- was better then recurred with bad air and outside work - - to resume singular - ? GERD related -plan for trial of PPI-May need to consider adding LAMA-recheck PFTs  11/23--reports a rough summer overall better on Advair 500  Worse with air quality issues-notes increased symptoms when stopping Advair  Plan to continue present management resume Singulair-and await PFTs  8/24- stopped singular for dreams remains on advair BID and overall seems to be doing well no primary complaint remains chest pressure  12/24- LOPEZ  less of the issue though remains-- increases with stress - wants to go to the gym- used to be very active -       #Chest pressure right side  Reports negative stress test at [facility]  Echo 7/22 with diastolic dysfunction grade 1 and normal RV function  3/23 denies any significant issues  7/23 denies issues  12/24- rarely there - no explanation on CT seems less of an issue      #New onset headaches  Occurred after stopping Singulair  Does not believe associated with her dental  appliance as they started prior to use  Continues to follow with primary with MRI pending  12/24 - not an issue         # DARYL  Symptoms compatible  Home test 6/22 with AHI of 12.9 ashley to 76% with 2 to 3 hours of sats under 88%  Tried CPAP for 3 to 4 days reports intolerant--now with dental appliance though unable to wear secondary to need for crown--discussed mechanisms at length encouraged to  7.23- - never resumed dental appliance- - ongoing teeth issues limiting   Discussed at length needs repeat study once appliance is functional versus repeat trial of CPAP following titration  11/23 believes her appliance is fully adjusted with plans for sleep study using and benefiting  8/24- had PSG with appliance - did advance sl - thinks ? Room for improvement - no jaw pain -   12/24- maxed on dental appliance- much less snoring - awakens now with nocturia x1-repeat home study with device-took it off retirement through but clearly shows improvement when she has a      # gerd /Hx Hpylori   History of esophagitis and gastritis on scope 2018  Recent upper scope and fluoroscopy esophagram 1/23 with signs of esophagitis and gastritis-follows with  at [facility]-to trial diet and continue PPI twice daily  7.23- flare  of asthma and ongoing /increased GERD_ sx- to add pepcid as trial - may need to see GI again   11/23 overall believes under control on PPI twice daily  12/24- remains very active issue - increases with stress - remains on ppi BID     # allergies   Remains on astelin and flonase twice a day and well controlled   Follows with [facility]NT at times   8/24- no recent visit - - remains on spray- flonase only   12/24- remains on flonase and astelin -- rt nares blocked- no recent ENT visit -recommended        Plan- decrease advair 500 to every morning -- return to twice a day if you note a difference  - rinse and spit -           - same nasal mediations                  -continue to use dental appliance every  night - to check overnight oximetry - with the dental appliance -   -        -see me in 4 months   -    Negrita Mccall MD  Pulmonary Medicine  12/18/2024

## 2024-12-18 NOTE — PATIENT INSTRUCTIONS
Plan- decrease advair 500 to every morning -- return to twice a day if you note a difference  - rinse and spit -           - same nasal mediations                  -continue to use dental appliance every night - to check overnight oximetry - with the dental appliance -   -        -see me in 4 months   -    Negrita Mccall MD  Pulmonary Medicine  12/18/2024

## 2024-12-20 ENCOUNTER — TELEPHONE (OUTPATIENT)
Facility: CLINIC | Age: 64
End: 2024-12-20

## 2024-12-20 DIAGNOSIS — R09.02 HYPOXEMIA: Primary | ICD-10-CM

## 2024-12-20 NOTE — TELEPHONE ENCOUNTER
Per Dr. Mccall:  Can you please order overnight oximetry on this patient with her dental appliance in place   Notified pt that ovox order sent to [facility].  Pt provided with number to contact them if questions or concerns.

## 2025-01-04 DIAGNOSIS — J45.40 MODERATE PERSISTENT ASTHMA, UNSPECIFIED WHETHER COMPLICATED (HCC): ICD-10-CM

## 2025-01-06 RX ORDER — FLUTICASONE PROPIONATE AND SALMETEROL 50; 500 UG/1; UG/1
1 POWDER RESPIRATORY (INHALATION) 2 TIMES DAILY
Qty: 1 EACH | Refills: 5 | Status: SHIPPED | OUTPATIENT
Start: 2025-01-06

## 2025-01-06 NOTE — TELEPHONE ENCOUNTER
Pt last seen by Dr. Mccall on 12-18-24.  Pt advised to continue Advair discus and to follow up in 4 months.  Appt scheduled for 4-16-25,  Refill for Advair sent.

## 2025-01-31 ENCOUNTER — TELEPHONE (OUTPATIENT)
Facility: CLINIC | Age: 65
End: 2025-01-31

## 2025-05-15 ENCOUNTER — OFFICE VISIT (OUTPATIENT)
Facility: CLINIC | Age: 65
End: 2025-05-15
Payer: MEDICARE

## 2025-05-15 ENCOUNTER — HOSPITAL ENCOUNTER (OUTPATIENT)
Dept: GENERAL RADIOLOGY | Facility: HOSPITAL | Age: 65
Discharge: HOME | End: 2025-05-15
Attending: INTERNAL MEDICINE
Payer: MEDICARE

## 2025-05-15 VITALS
SYSTOLIC BLOOD PRESSURE: 130 MMHG | RESPIRATION RATE: 16 BRPM | HEIGHT: 65 IN | DIASTOLIC BLOOD PRESSURE: 66 MMHG | HEART RATE: 81 BPM | OXYGEN SATURATION: 95 % | BODY MASS INDEX: 38.82 KG/M2 | TEMPERATURE: 99 F | WEIGHT: 233 LBS

## 2025-05-15 DIAGNOSIS — R06.89 DYSPNEA AND RESPIRATORY ABNORMALITIES: ICD-10-CM

## 2025-05-15 DIAGNOSIS — G47.33 OSA (OBSTRUCTIVE SLEEP APNEA): ICD-10-CM

## 2025-05-15 DIAGNOSIS — R06.00 DYSPNEA AND RESPIRATORY ABNORMALITIES: Primary | ICD-10-CM

## 2025-05-15 DIAGNOSIS — R06.89 DYSPNEA AND RESPIRATORY ABNORMALITIES: Primary | ICD-10-CM

## 2025-05-15 DIAGNOSIS — R06.00 DYSPNEA AND RESPIRATORY ABNORMALITIES: ICD-10-CM

## 2025-05-15 DIAGNOSIS — J45.40 MODERATE PERSISTENT ASTHMA, UNSPECIFIED WHETHER COMPLICATED (HCC): ICD-10-CM

## 2025-05-15 PROCEDURE — 71046 X-RAY EXAM CHEST 2 VIEWS: CPT | Performed by: INTERNAL MEDICINE

## 2025-05-15 RX ORDER — AZITHROMYCIN 250 MG/1
TABLET, FILM COATED ORAL
Qty: 6 TABLET | Refills: 0 | Status: SHIPPED | OUTPATIENT
Start: 2025-05-15

## 2025-05-15 RX ORDER — KETOCONAZOLE 20 MG/ML
SHAMPOO, SUSPENSION TOPICAL
COMMUNITY
Start: 2025-03-10

## 2025-05-15 RX ORDER — PREDNISONE 10 MG/1
TABLET ORAL
Qty: 19 TABLET | Refills: 0 | Status: SHIPPED | OUTPATIENT
Start: 2025-05-15

## 2025-05-15 RX ORDER — LISINOPRIL 2.5 MG/1
2.5 TABLET ORAL DAILY
COMMUNITY

## 2025-05-15 RX ORDER — TIRZEPATIDE 2.5 MG/.5ML
2.5 INJECTION, SOLUTION SUBCUTANEOUS WEEKLY
COMMUNITY
Start: 2025-03-18 | End: 2026-03-18

## 2025-05-15 NOTE — PROGRESS NOTES
Pulmonary Consult Note    History of Present Illness:  Gisele Hernandez is a 65 year old female presenting to pulmonary clinic today for dyspnea   DARYL and asthma 'intlerant of CPAP - using dental appliance- - had to stop releted to need for crowns   Asthma better with mdi-   Saw dr tate--   Told gerd- - careful with doet- and needs to lose weight and no eercise   Gets tired - too tired to exercise - trying for short time   advair twice a day and rescue -   Last year was much better-- bought portable neb- seasalt and albuterol - feels better longer-  - daily 1-2 times in the evening   Stopped singular - ran out   Dr best- lucinda     Was doing well on advair 500   Last month with problem with weather and air quality -   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-   Limited by breathing to do job   Steroids thinks contributing   Asthma started 2018- zoomba etc with exercise- same sx as little girl and with pregnancy     Started dental appliance no change and then problem with teeth and now with root mariam again - stomach upset with antibiotics and GERD makes asthma worse - thinks on ppi 20mg BID-   GI at [facility]-   Started with nutritionist - fodda diet - no change in GERD --  Sinus is OK_ some throat clearing and remains on flonase and astelin   Did not resume singular -   Remains with sense of chest tightness thinks gerd related -   Had swallowing video - slower passage   Had PFTS advocate rajni     11.23 - increased asthma - this year- - better. With advair 500   Works outside- bad with air quality   4-5 weeks ago with HA- as new issue and with dizzyness - thought related to sleep -- - throbbing- though better now and awaiting for MRI approval   Very sleepy - using dental appliance- wearing at night -   ? Migraines - not caused by appliance -   Unable to stop advair for 48hours for PFTS -   HA are better now   Uses rescue at night when awakens - not during the day   Some  coughing in am - shortness of breath is limiting   Much better overalll - worse in cold air and bad air   Goes to areas of storms and house issues -trying to get finacial aid to be    Remains with gerd - and bloating     8/24- remains now with recurrance of right side chest pressure-- hurts to press on it -\"sensitive\" ? Eating related ?- can't lay on left  side when occurs -related to pressure    4-5 episodes this year-- thinks related to food- remains on nexium - in am and eats early - knows has reflux-- seen on egd-- but does not feel - all the time with bloating - better if fasting- no BM issues - magnesium or tea for constipation   Breathing is good - unless has the pressure -   Remains with ongoing sense of pressure - if no pressure remains mostly OK_ like prior to 2018 12/24- about the same -- chest pressure thinks is better less feeling it - thinks advair 500 caused weight gain and hair loss -   Needs meds - less rescue use - - remains advair 500 bid -   Some coughing in am - time to clear   Breathing is pretty good -     5/25 - was well -- then with the 'flu\" April 4th--- tested + for flu B- cough and fevers and spasms - body aches- - got better but never fully went away then last Sunday resurgern-- cough recurred --   Ongoing PND - weight on chest - mucus - a lot - not expectorating - none from nose now - was there -   Had cxr with ? RML infiltrate tried with prednsione -- took only one day -   No n/v/d - some nausea -   Now with  tinnitus and saw ENT - for MRI - decreased hearing   Awakens with HA   Remains with dental appliance -   Resumed advair twice a day when sick - then decreased to once a day -   Had been doing well with once a day   No night sweats   Had fever yesterday and low grade today     Trying to get Zepbound - approved         Past Medical History:   Past Medical History:    ALCOHOL USE    Socially    Asthma (HCC)    Hyperlipidemia    Obesity    Years ago    Sleep  apnea      Long history of GERD though beginning 2018-following with GI at [facility]  Symptoms of DARYL-positive study now working through dental appliance  Denies any cardiac history    No history of clot no history of cancer      Past Surgical History:   Past Surgical History:   Procedure Laterality Date      , , , ,,     6    Colonoscopy      Hysterectomy      Other surgical history  2017    Abdominoplasty    Tubal ligation         Family Medical History:   Family History   Problem Relation Age of Onset    Cancer Mother         Had a oace maker    Heart Disorder Mother     Heart Disorder Father     Stroke Maternal Grandfather     Hypertension Maternal Grandmother     Asthma Paternal Grandfather     Diabetes Paternal Grandfather        Social History: Social History    Socioeconomic History      Marital status:   Works 3-9 daily  6 children multiple grandchildren      Allergies: Patient has no known allergies.     Medications:   Current Outpatient Medications   Medication Sig Dispense Refill    ketoconazole 2 % External Shampoo APPLY SHAMPOO TO SCALP TWICE WEEKLY AND LEAVE ON FOR 5 MINUTES BEFORE RINSING      Tirzepatide-Weight Management (ZEPBOUND) 2.5 MG/0.5ML Subcutaneous Solution Auto-injector Inject 2.5 mg into the skin once a week.      lisinopril 2.5 MG Oral Tab Take 1 tablet (2.5 mg total) by mouth daily.      fluticasone-salmeterol (ADVAIR DISKUS) 500-50 MCG/ACT Inhalation Aerosol Powder, Breath Activated INHALE 1 PUFF BY MOUTH 2 TIMES A DAY 1 each 5    fluticasone propionate 50 MCG/ACT Nasal Suspension INSTILL 2 SPRAYS IN EACH NOSTRIL EVERY DAY 16 g 3    Esomeprazole Magnesium 20 MG Oral Capsule Delayed Release Take 1 capsule (20 mg total) by mouth daily.      albuterol (2.5 MG/3ML) 0.083% Inhalation Nebu Soln Take 3 mL (2.5 mg total) by nebulization 3 (three) times daily. 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 No acute distress.   HEENT: Head NC/AT. PEERL. Throat is clear small area nares red and swollen per ? Polyp on the right   Cardio: . Rrr no murmer   Respiratory: Rare sense of rhonchi bilaterally breath sounds are diminished no significant rales noted  GI:  Abd soft, non-tender.  Extremities: No clubbing .trace at most   Neurologic: A&Ox3. No gross motor deficits.  Skin: Warm, dry.no rashes or hives noted   Lymphatic: No cervical or supraclavicular lymphadenopathy.no jvd   Psych: Calm, cooperative. Pleasant affect.     Assessment/Plan:    #History of dyspnea on exertion  History of asthma as a child improved with swimming lessons  2018 started with GERD subsequent shortness of breath following that thought related to esophagitis at that time  PFTs 6/22 with FEV1 1.44 L 56% of predicted with ratio of 65% and a 19% improvement following bronchodilators  3/23 had noted significant benefit when first on Advair with minimal rescue use now increasing rescue use-suspected component of fatigue as well--plan increase Advair to 500 and follow  7.23- was better then recurred with bad air and outside work - - to  resume singular - ? GERD related -plan for trial of PPI-May need to consider adding LAMA-recheck PFTs  11/23--reports a rough summer overall better on Advair 500  Worse with air quality issues-notes increased symptoms when stopping Advair  Plan to continue present management resume Singulair-and await PFTs  8/24- stopped singular for dreams remains on advair BID and overall seems to be doing well no primary complaint remains chest pressure  12/24- LOPEZ  less of the issue though remains-- increases with stress - wants to go to the gym- used to be very active -   5/25 was not active issue - went to gym treadmill - 20min   Now with flare following influenza B in April          #Chest pressure right side  Reports negative stress test at [facility]  Echo 7/22 with diastolic dysfunction grade 1 and normal RV function  3/23 denies any significant issues  7/23 denies issues  12/24- rarely there - no explanation on CT seems less of an issue  5/25 no issues       #New onset headaches  Occurred after stopping Singulair  Does not believe associated with her dental appliance as they started prior to use  Continues to follow with primary with MRI pending  12/24 - not an issue   5/25 - not daily - in am - ? Tinnitus related -more top of head discussed possible role of exercises for dental appliance after removal        # DARYL  Symptoms compatible  Home test 6/22 with AHI of 12.9 ashley to 76% with 2 to 3 hours of sats under 88%  Tried CPAP for 3 to 4 days reports intolerant--now with dental appliance though unable to wear secondary to need for crown--discussed mechanisms at length encouraged to  7.23- - never resumed dental appliance- - ongoing teeth issues limiting   Discussed at length needs repeat study once appliance is functional versus repeat trial of CPAP following titration  11/23 believes her appliance is fully adjusted with plans for sleep study using and benefiting  8/24- had PSG with appliance - did advance sl -  thinks ? 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        # gerd /Hx Hpylori   History of esophagitis and gastritis on scope 2018  Recent upper scope and fluoroscopy esophagram 1/23 with signs of esophagitis and gastritis-follows with  at [facility]-to trial diet and continue PPI twice daily  7.23- flare  of asthma and ongoing /increased GERD_ sx- to add pepcid as trial - may need to see GI again   11/23 overall believes under control on PPI twice daily  12/24- remains very active issue - increases with stress - remains on ppi BID   5/25 remains on ppi       # allergies   Remains on astelin and flonase twice a day and well controlled   Follows with [facility] ENT at times   8/24- no recent visit - - remains on spray- flonase only   12/24- remains on flonase and astelin -- rt nares blocked- no recent ENT visit -recommended  5/25 - flare with recent influ B-- remains on flonase -- BID     Plan- to get cxr today - we'll call with recommendations           -to begin prednsione             - continue advair 500 one puff twice a day for one month then decrease to once a day if well             - we will call with results of overnight oximetery - --- overnight reviewed 10 minutes less than 89% MARCIE of 29--May consider repeat  overnight or home study pending clinical course            - see me in 4 months                         Addendum--chest x-ray reviewed-slight increased markings anteriorly-not evident on lateral--when compared to report from 4/7/2025-reported streaky right middle lobe opacity right infrahilar opacity certainly no worse-  Plan for Z-Brien and prednisone        Negrita  MD Stefany  Pulmonary Medicine  5/15/2025

## 2025-05-15 NOTE — PATIENT INSTRUCTIONS
Plan- to get cxr today - we'll call with recommendations           -to begin prednsione             - continue advair 500 one puff twice a day for one month then decrease to once a day if well             - we will call with results of overnight oximetery -             - see me in 4 months                          -         Negrita Mccall MD  Pulmonary Medicine  5/15/2025

## 2025-05-16 ENCOUNTER — PATIENT MESSAGE (OUTPATIENT)
Facility: CLINIC | Age: 65
End: 2025-05-16

## 2025-05-16 NOTE — PROGRESS NOTES
Orders sent to [facility] via [facility] for OVOX, room air and dental appliance. Detailed mcm sent to pt.    [phone] (home)

## 2025-05-30 NOTE — TELEPHONE ENCOUNTER
Pt left message on vm, states she has not received any updates from [facility] regarding her OVOX test.  Nurse instructed pt to contact E to get a status update on her OVOX testing. E contact number provided.

## 2025-06-26 ENCOUNTER — MED REC SCAN ONLY (OUTPATIENT)
Facility: CLINIC | Age: 65
End: 2025-06-26

## 2025-08-06 ENCOUNTER — TELEPHONE (OUTPATIENT)
Facility: CLINIC | Age: 65
End: 2025-08-06

## 2025-08-07 RX ORDER — FLUTICASONE FUROATE AND VILANTEROL TRIFENATATE 200; 25 UG/1; UG/1
1 POWDER RESPIRATORY (INHALATION) DAILY
Qty: 1 EACH | Refills: 11 | Status: SHIPPED | OUTPATIENT
Start: 2025-08-07